# Patient Record
Sex: MALE | Race: OTHER | HISPANIC OR LATINO | URBAN - METROPOLITAN AREA
[De-identification: names, ages, dates, MRNs, and addresses within clinical notes are randomized per-mention and may not be internally consistent; named-entity substitution may affect disease eponyms.]

---

## 2020-07-12 ENCOUNTER — EMERGENCY (EMERGENCY)
Facility: HOSPITAL | Age: 40
LOS: 1 days | Discharge: HOME | End: 2020-07-12
Attending: EMERGENCY MEDICINE
Payer: COMMERCIAL

## 2020-07-12 VITALS
HEART RATE: 98 BPM | DIASTOLIC BLOOD PRESSURE: 91 MMHG | WEIGHT: 229.94 LBS | RESPIRATION RATE: 19 BRPM | TEMPERATURE: 98 F | SYSTOLIC BLOOD PRESSURE: 140 MMHG | OXYGEN SATURATION: 99 % | HEIGHT: 66 IN

## 2020-07-12 PROCEDURE — 73110 X-RAY EXAM OF WRIST: CPT

## 2020-07-12 PROCEDURE — 90715 TDAP VACCINE 7 YRS/> IM: CPT

## 2020-07-12 PROCEDURE — 73110 X-RAY EXAM OF WRIST: CPT | Mod: 26,LT

## 2020-07-12 PROCEDURE — 73130 X-RAY EXAM OF HAND: CPT

## 2020-07-12 PROCEDURE — 99283 EMERGENCY DEPT VISIT LOW MDM: CPT

## 2020-07-12 PROCEDURE — 73130 X-RAY EXAM OF HAND: CPT | Mod: 26,LT

## 2020-07-12 PROCEDURE — 99284 EMERGENCY DEPT VISIT MOD MDM: CPT

## 2020-07-12 RX ORDER — CEPHALEXIN 500 MG
1 CAPSULE ORAL
Qty: 40 | Refills: 0
Start: 2020-07-12 | End: 2020-07-21

## 2020-07-12 RX ORDER — CEPHALEXIN 500 MG
1 CAPSULE ORAL
Qty: 20 | Refills: 0
Start: 2020-07-12 | End: 2020-07-21

## 2020-07-12 RX ORDER — CEPHALEXIN 500 MG
500 CAPSULE ORAL
Refills: 0 | Status: DISCONTINUED | OUTPATIENT
Start: 2020-07-12 | End: 2020-07-17

## 2020-07-12 RX ORDER — TETANUS TOXOID, REDUCED DIPHTHERIA TOXOID AND ACELLULAR PERTUSSIS VACCINE, ADSORBED 5; 2.5; 8; 8; 2.5 [IU]/.5ML; [IU]/.5ML; UG/.5ML; UG/.5ML; UG/.5ML
0.5 SUSPENSION INTRAMUSCULAR ONCE
Refills: 0 | Status: COMPLETED | OUTPATIENT
Start: 2020-07-12 | End: 2020-07-12

## 2020-07-12 RX ADMIN — TETANUS TOXOID, REDUCED DIPHTHERIA TOXOID AND ACELLULAR PERTUSSIS VACCINE, ADSORBED 0.5 MILLILITER(S): 5; 2.5; 8; 8; 2.5 SUSPENSION INTRAMUSCULAR at 22:02

## 2020-07-12 RX ADMIN — Medication 500 MILLIGRAM(S): at 22:02

## 2020-07-12 NOTE — ED PROVIDER NOTE - SKIN, MLM
vertical lac to the volar aspect of the distal wrist, 3.5cm in length, 2cm in width, no active bleeding, no visualized FB vertical lac to the volar aspect of the distal wrist, 3.5cm in length, 2cm in width, no active bleeding, no visualized FB or tendon lacs

## 2020-07-12 NOTE — ED ADULT TRIAGE NOTE - CHIEF COMPLAINT QUOTE
Patient presented to ed secondary to laceration at left wrist secondary slip and fall on glass. radial pulse present at left wrist. cap refill less than three. no bleeding at the site noted. open area at left wrist noted. Patient denies hitting head, loc and  hitting glass any where else. Denies blood thinners

## 2020-07-12 NOTE — ED PROVIDER NOTE - MUSCULOSKELETAL, MLM
b/l wrist tender to palpation, pain with flexion and extension of the wrist, fingers nontender to palpation b/l wrist tender to palpation, able to flex and extend the L wrist, fingers nontender to palpation

## 2020-07-12 NOTE — ED PROVIDER NOTE - PATIENT PORTAL LINK FT
You can access the FollowMyHealth Patient Portal offered by [facility] by registering at the following website: [email or web address]. By joining [employer]’s FollowMyHealth portal, you will also be able to view your health information using other applications (apps) compatible with our system.

## 2020-07-12 NOTE — ED PROVIDER NOTE - NSFOLLOWUPINSTRUCTIONS_ED_ALL_ED_FT
[place] los medicamentos según lo prescrito.  Seguimiento con el médico de cirugía plástica  Regreso a la ed para fiebres, escalofríos, aumento del enrojecimiento o secreción del sitio de la herida  cambiar la gasa todos los días y aplicar un zeina ópico en el sitio de la herida

## 2020-07-12 NOTE — ED PROVIDER NOTE - PROGRESS NOTE DETAILS
Xray neg for fx. Wound site irrigated thoroughly, xeroform gauze applied. Will d/c with plastics for follow up. Strict return precautions provided.

## 2020-07-12 NOTE — ED PROVIDER NOTE - NEUROLOGICAL, MLM
Alert and oriented, no focal deficits, no motor or sensory deficits. Alert and oriented, no focal deficits, no motor or sensory deficits, nl sensation to all digits on L hand, nl cap refill to L wrist

## 2020-07-12 NOTE — ED PROVIDER NOTE - OBJECTIVE STATEMENT
40 year old male with no PMHx presents to the ED for lac to the L wrist which occurred at 10PM yesterday. Pt reports he was walking with a wine glass, when he tripped and fell and the wine glass cut his wrist. Pt reports the incident was an accident. Denies intention of hurting or killing himself. Reports pain to the wrist and fingers and pain to the site of the wound. Pt has had lac covered for the last day. Denies fevers, chills, elbow pain, HA, pain to lower extremities. Last tetanus unknown.

## 2020-07-12 NOTE — ED PROVIDER NOTE - CARE PROVIDER_API CALL
Prosper Rodriguez  Plastic Surgery  [address] [place] [id number]  Phone: [phone]  Fax: [phone]  Follow Up Time:

## 2020-07-12 NOTE — ED PROVIDER NOTE - CLINICAL SUMMARY MEDICAL DECISION MAKING FREE TEXT BOX
Will not close lac given 24 hours since incident occurred. Will apply Bacitracin, gauze, irrigate wound, get xray of the hand and wrist and give tetanus. Will not close lac given 24 hours since incident occurred. Pt denies depression or suicidal intension. Will apply Xeroform, gauze, irrigate wound, get xray of the hand and wrist and give tetanus. Will give plastics for follow up.

## 2020-07-12 NOTE — ED PROVIDER NOTE - ATTENDING CONTRIBUTION TO CARE
The patient seen and examined    Wrist Laceration    I, Narinder Galvan, performed the initial face to face bedside interview with this patient regarding history of present illness, review of symptoms and relevant past medical, social and family history.  I completed an independent physical examination.  I was the initial provider who evaluated this patient. I have signed out the follow up of any pending tests (i.e. labs, radiological studies) to the ACP.  I have communicated the patient’s plan of care and disposition with the ACP.

## 2020-07-13 NOTE — ED POST DISCHARGE NOTE - RESULT SUMMARY
pharmacy called advised two RX Keflex sent to pharmacy one twice a day and one every 6 hours, advised to fill q 6hours

## 2022-04-07 ENCOUNTER — EMERGENCY (EMERGENCY)
Facility: HOSPITAL | Age: 42
LOS: 1 days | Discharge: HOME | End: 2022-04-07
Attending: STUDENT IN AN ORGANIZED HEALTH CARE EDUCATION/TRAINING PROGRAM
Payer: COMMERCIAL

## 2022-04-07 VITALS
SYSTOLIC BLOOD PRESSURE: 174 MMHG | DIASTOLIC BLOOD PRESSURE: 94 MMHG | OXYGEN SATURATION: 98 % | WEIGHT: 229.94 LBS | HEIGHT: 66 IN | RESPIRATION RATE: 16 BRPM | TEMPERATURE: 97 F | HEART RATE: 75 BPM

## 2022-04-07 PROCEDURE — 99283 EMERGENCY DEPT VISIT LOW MDM: CPT | Mod: 25

## 2022-04-07 PROCEDURE — 96372 THER/PROPH/DIAG INJ SC/IM: CPT

## 2022-04-07 PROCEDURE — 99284 EMERGENCY DEPT VISIT MOD MDM: CPT

## 2022-04-07 RX ORDER — CYCLOBENZAPRINE HYDROCHLORIDE 10 MG/1
1 TABLET, FILM COATED ORAL
Qty: 9 | Refills: 0
Start: 2022-04-07 | End: 2022-04-09

## 2022-04-07 RX ORDER — LIDOCAINE 4 G/100G
1 CREAM TOPICAL ONCE
Refills: 0 | Status: COMPLETED | OUTPATIENT
Start: 2022-04-07 | End: 2022-04-07

## 2022-04-07 RX ORDER — IBUPROFEN 200 MG
1 TABLET ORAL
Qty: 9 | Refills: 0
Start: 2022-04-07 | End: 2022-04-09

## 2022-04-07 RX ORDER — KETOROLAC TROMETHAMINE 30 MG/ML
30 SYRINGE (ML) INJECTION ONCE
Refills: 0 | Status: DISCONTINUED | OUTPATIENT
Start: 2022-04-07 | End: 2022-04-07

## 2022-04-07 RX ORDER — DIAZEPAM 5 MG
5 TABLET ORAL ONCE
Refills: 0 | Status: DISCONTINUED | OUTPATIENT
Start: 2022-04-07 | End: 2022-04-07

## 2022-04-07 RX ADMIN — Medication 30 MILLIGRAM(S): at 20:08

## 2022-04-07 RX ADMIN — Medication 5 MILLIGRAM(S): at 20:08

## 2022-04-07 RX ADMIN — LIDOCAINE 1 PATCH: 4 CREAM TOPICAL at 20:07

## 2022-04-07 NOTE — ED PROVIDER NOTE - CLINICAL SUMMARY MEDICAL DECISION MAKING FREE TEXT BOX
no neuro deficits on exam, no red flags for back pain,  no midline tenderness of spine no injuries or trauma to the back   pain control reassess

## 2022-04-07 NOTE — ED PROVIDER NOTE - OBJECTIVE STATEMENT
pt is a 41 y/o male presenting to the ed for evaluation. 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 pt denies direct injuries or trauma to the area. pt states pain worsens with movement. pt has not taken any medication for pain at home. pt states has been suffering from back pain for the past ten years after having a fall ten years ago. pt states he did physical therpay after the fall for two years then stopped. pt denies cp sob cough fever abd pain nausea vomiting numbness or loss of sensation urinary or fecal incontinence abrasions or lacerations

## 2022-04-07 NOTE — ED PROVIDER NOTE - CARE PROVIDER_API CALL
Rashad London (DO)  Orthopaedic Surgery  [address] [place] [address], [address]  [place] [address]  Phone: [phone]  Fax: [phone]  Follow Up Time:

## 2022-04-07 NOTE — ED PROVIDER NOTE - PATIENT PORTAL LINK FT
You can access the FollowMyHealth Patient Portal offered by [facility] by registering at the following website: [email or web address]. By joining [employer]’s FollowMyHealth portal, you will also be able to view your health information using other applications (apps) compatible with our system.

## 2022-04-07 NOTE — ED PROVIDER NOTE - NSFOLLOWUPINSTRUCTIONS_ED_ALL_ED_FT
[facility]                                                                                                       Acute Back Pain, Adult      Acute back pain is sudden and usually short-lived. It is often caused by an injury to the muscles and tissues in the back. The injury may result from:  •A muscle or ligament getting overstretched or torn (strained). Ligaments are tissues that connect bones to each other. Lifting something improperly can cause a back strain.      •Wear and tear (degeneration) of the spinal disks. Spinal disks are circular tissue that provide cushioning between the bones of the spine (vertebrae).      •Twisting motions, such as while playing sports or doing yard work.      •A hit to the back.      •Arthritis.      You may have a physical exam, lab tests, and imaging tests to find the cause of your pain. Acute back pain usually goes away with rest and home care.      Follow these instructions at home:    Managing pain, stiffness, and swelling     •Treatment may include medicines for pain and inflammation that are taken by mouth or applied to the skin, prescription pain medicine, or muscle relaxants. Take over-the-counter and prescription medicines only as told by your health care provider.    •Your health care provider may recommend applying ice during the first 24–48 hours after your pain starts. To do this:  •Put ice in a plastic bag.      •Place a towel between your skin and the bag.      •Leave the ice on for 20 minutes, 2–3 times a day.      •If directed, apply heat to the affected area as often as told by your health care provider. Use the heat source that your health care provider recommends, such as a moist heat pack or a heating pad.  •Place a towel between your skin and the heat source.      •Leave the heat on for 20–30 minutes.      •Remove the heat if your skin turns bright red. This is especially important if you are unable to feel pain, heat, or cold. You have a greater risk of getting burned.          Activity      • Do not stay in bed. Staying in bed for more than 1–2 days can delay your recovery.    •Sit up and stand up straight. Avoid leaning forward when you sit or hunching over when you stand.  •If you work at a desk, sit close to it so you do not need to lean over. Keep your chin tucked in. Keep your neck drawn back, and keep your elbows bent at a 90-degree angle (right angle).      •Sit high and close to the steering wheel when you drive. Add lower back (lumbar) support to your car seat, if needed.        •Take short walks on even surfaces as soon as you are able. Try to increase the length of time you walk each day.      • Do not sit, drive, or  one place for more than 30 minutes at a time. Sitting or standing for long periods of time can put stress on your back.      • Do not drive or use heavy machinery while taking prescription pain medicine.    •Use proper lifting techniques. When you bend and lift, use positions that put less stress on your back:  •Bend your knees.      •Keep the load close to your body.      •Avoid twisting.        •Exercise regularly as told by your health care provider. Exercising helps your back heal faster and helps prevent back injuries by keeping muscles strong and flexible.      •Work with a physical therapist to make a safe exercise program, as recommended by your health care provider. Do any exercises as told by your physical therapist.      Lifestyle     •Maintain a healthy weight. Extra weight puts stress on your back and makes it difficult to have good posture.      •Avoid activities or situations that make you feel anxious or stressed. Stress and anxiety increase muscle tension and can make back pain worse. Learn ways to manage anxiety and stress, such as through exercise.      General instructions     •Sleep on a firm mattress in a comfortable position. Try lying on your side with your knees slightly bent. If you lie on your back, put a pillow under your knees.    •Follow your treatment plan as told by your health care provider. This may include:  •Cognitive or behavioral therapy.      •Acupuncture or massage therapy.      •Meditation or yoga.          Contact a health care provider if:    •You have pain that is not relieved with rest or medicine.      •You have increasing pain going down into your legs or buttocks.      •Your pain does not improve after 2 weeks.      •You have pain at night.      •You lose weight without trying.      •You have a fever or chills.        Get help right away if:    •You develop new bowel or bladder control problems.      •You have unusual weakness or numbness in your arms or legs.      •You develop nausea or vomiting.      •You develop abdominal pain.      •You feel faint.        Summary    •Acute back pain is sudden and usually short-lived.      •Use proper lifting techniques. When you bend and lift, use positions that put less stress on your back.      •Take over-the-counter and prescription medicines and apply heat or ice as directed by your health care provider.      This information is not intended to replace advice given to you by your health care provider. Make sure you discuss any questions you have with your health care provider.      Document Revised: 09/07/2021 Document Reviewed: 09/10/2021    Elsevier Patient Education © 2022 [employer]

## 2022-04-07 NOTE — ED PROVIDER NOTE - PHYSICAL EXAMINATION
Const: Awake, alert and oriented. In no acute distress. Well appearing.  HEENT: NC/AT. Moist mucous membranes.  Eyes: No scleral icterus. EOMI.  Neck:. Soft and supple. Full ROM without pain.  Cardiac: +S1/S2. No murmurs. Peripheral pulses 2+ and symmetric. No LE edema.  Resp: Speaking in full sentences. No evidence of respiratory distress. No wheezes, rales or rhonchi.  Abd: Soft, non-tender, non-distended. Normal bowel sounds in all 4 quadrants. No guarding or rebound.  Back: left and right paraspinal lumbar tenderness on palpation no bony step offs or deformities felt on palpation   Skin: No rashes, abrasions or lacerations.  Lymph: No cervical lymphadenopathy.  Neuro: Awake, alert & oriented x 3. CN II-XII intact finger to nose intact neurovascularly intact muscle strength fair gait without ataxia reflexes intact

## 2022-04-07 NOTE — ED ADULT TRIAGE NOTE - CHIEF COMPLAINT QUOTE
pt reports left sided back pain radiating down left leg from old work injury. denies urinary sx, numbness/tingling.

## 2022-04-07 NOTE — ED PROVIDER NOTE - NS ED ATTENDING STATEMENT MOD
This was a shared visit with the NICOLE. I reviewed and verified the documentation and independently performed the documented:

## 2022-04-13 ENCOUNTER — APPOINTMENT (OUTPATIENT)
Dept: NEUROSURGERY | Facility: CLINIC | Age: 42
End: 2022-04-13
Payer: COMMERCIAL

## 2022-04-13 VITALS
HEIGHT: 66 IN | TEMPERATURE: 97.6 F | BODY MASS INDEX: 36.96 KG/M2 | DIASTOLIC BLOOD PRESSURE: 88 MMHG | SYSTOLIC BLOOD PRESSURE: 134 MMHG | WEIGHT: 230 LBS | HEART RATE: 81 BPM | OXYGEN SATURATION: 98 %

## 2022-04-13 DIAGNOSIS — M54.16 RADICULOPATHY, LUMBAR REGION: ICD-10-CM

## 2022-04-13 PROCEDURE — 99203 OFFICE O/P NEW LOW 30 MIN: CPT

## 2022-04-13 RX ORDER — GABAPENTIN 300 MG/1
300 CAPSULE ORAL
Qty: 30 | Refills: 2 | Status: ACTIVE | COMMUNITY
Start: 2022-04-13 | End: 1900-01-01

## 2022-04-13 RX ORDER — CYCLOBENZAPRINE HYDROCHLORIDE 10 MG/1
10 TABLET, FILM COATED ORAL
Refills: 0 | Status: ACTIVE | COMMUNITY

## 2022-04-13 RX ORDER — IBUPROFEN 600 MG/1
600 TABLET, FILM COATED ORAL
Refills: 0 | Status: ACTIVE | COMMUNITY

## 2022-04-14 NOTE — HISTORY OF PRESENT ILLNESS
[> 3 months] : more  than 3 months [FreeTextEntry1] : lower back pain [de-identified] : DWAIN TEE is a 42 year old male who presents for neurosurgical evaluation of lower back pain. 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 pt denies direct injuries or trauma to the area. pt states pain worsens with movement. pt has not taken any medication for pain at home. Pt states that he has been suffering from back pain for the past ten years after having a bad fall ten years ago. Since that fall he has done PT on and off with mild relief. He was working with a chiropractor for the last two years with no long lasting relief. He also has had multiple injections placed that help for about 6 months but the pain always comes back and is starting to come back more frequently. Last injection was last month. He states he is able to walk but the pain does get worse. He denies  incontinence, loss of strength, or muscle weakness, and imbalance. But does endorse shooting pain and tingling/numbness down both legs bilaterally. He state this pain is interfering with him going to work because the pain is happening frequently. [place] changes [place].

## 2022-04-14 NOTE — ASSESSMENT
[FreeTextEntry1] : Patient with above history and no imaging. Antalgic gait, but no neurological deficits. Conservative measures have failed, will order MRI lumbar spine to assess for any pathology, and any surgical needs. We discussed the benefits of alternating heat, ice and Icy Hot Cream to LB and he may try gentle stretches at home in the interim. 3\par \par \par Plan:\par MRI lumbar spine\par f/u after imaging\par Patient knows to call the office if there are any new or worsening symptoms. \par All questions and concerns answered and addressed in detail to patient's complete satisfaction. Patient verbalized understanding and agreed to plan.\par \par

## 2022-04-14 NOTE — PHYSICAL EXAM
[General Appearance - In No Acute Distress] : in no acute distress [General Appearance - Alert] : alert [General Appearance - Well Nourished] : well nourished [Oriented To Time, Place, And Person] : oriented to person, place, and time [Impaired Insight] : insight and judgment were intact [Affect] : the affect was normal [Sensation Tactile Decrease] : light touch was intact [Sensation Pain / Temperature Decrease] : pain and temperature was intact [Balance] : balance was intact [No Tenderness to Palpation] : no spine tenderness on palpation [No Visual Abnormalities] : no visible abnormailities [Antalgic] : antalgic [Able to toe walk] : the patient was able to toe walk [Able to heel walk] : the patient was able to heel walk [Sclera] : the sclera and conjunctiva were normal [Neck Appearance] : the appearance of the neck was normal [] : no respiratory distress [Involuntary Movements] : no involuntary movements were seen [Motor Tone] : muscle strength and tone were normal [FreeTextEntry1] : tenderness to palpation lower spine

## 2022-11-17 NOTE — ED PROVIDER NOTE - CROS ED NEURO ALL NEG
November 17, 2022     Patient: Latrice Mccarty  YOB: 1993  Date of Visit: 11/17/2022      To Whom it May Concern:    Debra Link is under my professional care  Tyler was seen in my office on 11/17/2022  Tyler has no use of the right hand for work until he is seen back in the office in about 4 weeks  Restrictions will be re-evaluated at that time    If you have any questions or concerns, please don't hesitate to call           Sincerely,          Mabel Beavers MD        CC: Latrice Mccarty negative...